# Patient Record
Sex: MALE | HISPANIC OR LATINO | Employment: OTHER | URBAN - METROPOLITAN AREA
[De-identification: names, ages, dates, MRNs, and addresses within clinical notes are randomized per-mention and may not be internally consistent; named-entity substitution may affect disease eponyms.]

---

## 2024-01-02 ENCOUNTER — OFFICE VISIT (OUTPATIENT)
Dept: URGENT CARE | Facility: URGENT CARE | Age: 29
End: 2024-01-02

## 2024-01-02 VITALS
SYSTOLIC BLOOD PRESSURE: 128 MMHG | WEIGHT: 190.2 LBS | HEART RATE: 64 BPM | DIASTOLIC BLOOD PRESSURE: 83 MMHG | TEMPERATURE: 98.9 F | OXYGEN SATURATION: 96 % | RESPIRATION RATE: 24 BRPM

## 2024-01-02 DIAGNOSIS — K64.8 INTERNAL HEMORRHOIDS WITHOUT COMPLICATION: Primary | ICD-10-CM

## 2024-01-02 DIAGNOSIS — K92.1 BLOOD IN STOOL: ICD-10-CM

## 2024-01-02 PROCEDURE — 99203 OFFICE O/P NEW LOW 30 MIN: CPT | Performed by: PHYSICIAN ASSISTANT

## 2024-01-02 RX ORDER — HYDROCORTISONE ACETATE 25 MG/1
25 SUPPOSITORY RECTAL 2 TIMES DAILY PRN
Qty: 12 SUPPOSITORY | Refills: 0 | Status: SHIPPED | OUTPATIENT
Start: 2024-01-02 | End: 2024-01-02

## 2024-01-02 RX ORDER — HYDROCORTISONE ACETATE 25 MG/1
25 SUPPOSITORY RECTAL 2 TIMES DAILY PRN
Qty: 12 SUPPOSITORY | Refills: 0 | Status: SHIPPED | OUTPATIENT
Start: 2024-01-02

## 2024-01-02 ASSESSMENT — ENCOUNTER SYMPTOMS
FATIGUE: 0
FEVER: 0
DYSURIA: 0
CHILLS: 0
CONSTIPATION: 0
HEARTBURN: 0
WHEEZING: 0
ABDOMINAL PAIN: 0
HEMATURIA: 0
FLANK PAIN: 0
DIARRHEA: 0
SHORTNESS OF BREATH: 0
VOMITING: 0
CARDIOVASCULAR NEGATIVE: 1
RESPIRATORY NEGATIVE: 1
FREQUENCY: 0
HEMATOCHEZIA: 1
PALPITATIONS: 0
CHEST TIGHTNESS: 0
NAUSEA: 0
COUGH: 0

## 2024-01-02 NOTE — PROGRESS NOTES
Arjun Huerta is a 28 year old, presenting for the following health issues:  Rectal Problem (YESTERDAY NOTICE BLOOD IN STOOL, HAPPENED AGAIN 7 MONTHS AGO FOR 2 DAYS, NO BLOOD IN STOOL TODAY - NO STRAINING )    HPI   Blood in stools  Onset/Duration: yesterday. Reports similar episode 7months ago which resolved on its own.  No blood in his stools today.  Description:   Stephenie-anal lump: No  Pain: No  Itching: No  Accompanying Signs & Symptoms:  Blood in stool: YES, dripping into the toilet  Changes in stool pattern: YES- hard stools  History:   Any previous GI studies done:none  Family History of colon cancer: No  Precipitating factors:   None  Alleviating factors:  None  Therapies tried and outcome: none    There is no problem list on file for this patient.    No current outpatient medications on file.     No current facility-administered medications for this visit.      No Known Allergies    Review of Systems   Constitutional:  Negative for chills, fatigue and fever.   Respiratory: Negative.  Negative for cough, chest tightness, shortness of breath and wheezing.    Cardiovascular: Negative.  Negative for chest pain, palpitations and peripheral edema.   Gastrointestinal:  Positive for hematochezia. Negative for abdominal pain, constipation, diarrhea, heartburn, nausea and vomiting.   Genitourinary:  Negative for dysuria, flank pain, frequency, hematuria, pelvic pain, urgency, vaginal bleeding and vaginal discharge.   All other systems reviewed and are negative.           Objective    /83   Pulse 64   Temp 98.9  F (37.2  C) (Oral)   Resp 24   Wt 86.3 kg (190 lb 3.2 oz)   SpO2 96%   There is no height or weight on file to calculate BMI.  Physical Exam  Vitals and nursing note reviewed.   Constitutional:       General: He is not in acute distress.     Appearance: Normal appearance. He is normal weight. He is not ill-appearing.   Genitourinary:     Prostate: Normal.      Rectum: Guaiac result negative.  Internal hemorrhoid present. No mass, tenderness, anal fissure or external hemorrhoid. Normal anal tone.   Skin:     General: Skin is warm.      Capillary Refill: Capillary refill takes less than 2 seconds.   Neurological:      Mental Status: He is alert and oriented to person, place, and time.   Psychiatric:         Mood and Affect: Mood normal.         Behavior: Behavior normal.         Thought Content: Thought content normal.         Judgment: Judgment normal.            Assessment/Plan:  Internal hemorrhoids without complication: Will give anusol suppositories as needed for hemorrhoids.  Also recommend increase fiber, fluids, exercise, and stool softener.  Recheck in clinic with PCP if symptoms worsen or if symptoms do not improve.  -     hydrocortisone (ANUSOL-HC) 25 MG suppository; Place 1 suppository (25 mg) rectally 2 times daily as needed for hemorrhoids    Blood in stool          Caren See RILEY Velasco